# Patient Record
Sex: FEMALE | Race: BLACK OR AFRICAN AMERICAN | NOT HISPANIC OR LATINO | URBAN - METROPOLITAN AREA
[De-identification: names, ages, dates, MRNs, and addresses within clinical notes are randomized per-mention and may not be internally consistent; named-entity substitution may affect disease eponyms.]

---

## 2017-01-01 ENCOUNTER — OFFICE VISIT - HEALTHEAST (OUTPATIENT)
Dept: GERIATRICS | Facility: CLINIC | Age: 52
End: 2017-01-01

## 2017-01-01 ENCOUNTER — AMBULATORY - HEALTHEAST (OUTPATIENT)
Dept: GERIATRICS | Facility: CLINIC | Age: 52
End: 2017-01-01

## 2017-01-01 ENCOUNTER — AMBULATORY - HEALTHEAST (OUTPATIENT)
Dept: ADMINISTRATIVE | Facility: CLINIC | Age: 52
End: 2017-01-01

## 2017-01-01 DIAGNOSIS — I10 ESSENTIAL HYPERTENSION: ICD-10-CM

## 2017-01-01 DIAGNOSIS — C50.919 BREAST CANCER (H): ICD-10-CM

## 2017-01-01 DIAGNOSIS — J18.9 PNEUMONIA: ICD-10-CM

## 2017-01-01 DIAGNOSIS — F32.A DEPRESSION: ICD-10-CM

## 2017-01-01 DIAGNOSIS — I26.99 PULMONARY EMBOLISM (H): ICD-10-CM

## 2017-01-01 DIAGNOSIS — R63.0 POOR APPETITE: ICD-10-CM

## 2017-01-01 DIAGNOSIS — R62.7 FTT (FAILURE TO THRIVE) IN ADULT: ICD-10-CM

## 2017-01-01 DIAGNOSIS — D64.9 ANEMIA: ICD-10-CM

## 2017-01-01 DIAGNOSIS — C79.31 METASTASIS TO BRAIN (H): ICD-10-CM

## 2017-01-01 DIAGNOSIS — D64.9 ANEMIA, UNSPECIFIED TYPE: ICD-10-CM

## 2017-01-01 DIAGNOSIS — C50.919 METASTATIC BREAST CANCER: ICD-10-CM

## 2017-01-01 DIAGNOSIS — I10 HYPERTENSION: ICD-10-CM

## 2017-01-01 DIAGNOSIS — E46 MALNUTRITION (H): ICD-10-CM

## 2017-01-01 RX ORDER — HALOPERIDOL 0.5 MG/1
0.5 TABLET ORAL
Status: SHIPPED | COMMUNITY
Start: 2017-01-01

## 2017-01-01 RX ORDER — CITALOPRAM HYDROBROMIDE 20 MG/1
40 TABLET ORAL DAILY
Status: SHIPPED | COMMUNITY
Start: 2017-01-01

## 2017-01-01 RX ORDER — HALOPERIDOL 2 MG/ML
0.5 SOLUTION ORAL
Status: SHIPPED | COMMUNITY
Start: 2017-01-01

## 2017-01-01 RX ORDER — DEXAMETHASONE 4 MG/1
4 TABLET ORAL EVERY 8 HOURS
Status: SHIPPED | COMMUNITY
Start: 2017-01-01

## 2017-01-01 RX ORDER — DIPHENOXYLATE HCL/ATROPINE 2.5-.025MG
1 TABLET ORAL
Status: SHIPPED | COMMUNITY
Start: 2017-01-01

## 2017-01-01 RX ORDER — LEVETIRACETAM 1000 MG/1
1000 TABLET ORAL EVERY MORNING
Status: SHIPPED | COMMUNITY
Start: 2017-01-01

## 2017-01-01 RX ORDER — ATROPINE SULFATE 10 MG/ML
2 SOLUTION/ DROPS OPHTHALMIC
Status: SHIPPED | COMMUNITY
Start: 2017-01-01

## 2017-01-01 RX ORDER — LORAZEPAM 0.5 MG/1
0.5 TABLET ORAL
Status: SHIPPED | COMMUNITY
Start: 2017-01-01

## 2017-01-01 RX ORDER — LORAZEPAM 2 MG/ML
1 CONCENTRATE ORAL PRN
Status: SHIPPED | COMMUNITY
Start: 2017-01-01

## 2017-01-01 RX ORDER — PRAZOSIN HYDROCHLORIDE 1 MG/1
1 CAPSULE ORAL AT BEDTIME
Status: SHIPPED | COMMUNITY
Start: 2017-01-01

## 2017-01-01 RX ORDER — ACETAMINOPHEN 325 MG/1
650 TABLET ORAL EVERY 4 HOURS PRN
Status: SHIPPED | COMMUNITY
Start: 2017-01-01

## 2017-01-01 RX ORDER — OXYCODONE HYDROCHLORIDE 5 MG/1
5 TABLET ORAL
Status: SHIPPED | COMMUNITY
Start: 2017-01-01

## 2017-01-01 RX ORDER — LEVETIRACETAM 500 MG/1
500 TABLET ORAL AT BEDTIME
Status: SHIPPED | COMMUNITY
Start: 2017-01-01

## 2017-01-01 RX ORDER — ALBUTEROL SULFATE 90 UG/1
2 AEROSOL, METERED RESPIRATORY (INHALATION) EVERY 4 HOURS PRN
Status: SHIPPED | COMMUNITY
Start: 2017-01-01

## 2017-01-01 RX ORDER — PROCHLORPERAZINE MALEATE 10 MG
10 TABLET ORAL EVERY 6 HOURS PRN
Status: SHIPPED | COMMUNITY
Start: 2017-01-01

## 2017-01-01 RX ORDER — BACLOFEN 10 MG/1
5 TABLET ORAL 3 TIMES DAILY PRN
Status: SHIPPED | COMMUNITY
Start: 2017-01-01

## 2017-01-01 ASSESSMENT — MIFFLIN-ST. JEOR: SCORE: 1620.57

## 2017-09-04 ENCOUNTER — AMBULATORY - HEALTHEAST (OUTPATIENT)
Dept: GERIATRICS | Facility: CLINIC | Age: 52
End: 2017-09-04

## 2021-05-31 VITALS — WEIGHT: 220.2 LBS | HEIGHT: 66 IN | BODY MASS INDEX: 35.39 KG/M2

## 2021-05-31 VITALS — WEIGHT: 228 LBS

## 2021-05-31 VITALS — WEIGHT: 211.2 LBS

## 2021-06-10 NOTE — PROGRESS NOTES
[facility]      Facility:    [facility] [[id number]]  Code Status: DNR/DNI       Chief Complaint/Reason for Visit:  Chief Complaint   Patient presents with     H & P       HPI:   Cindi is a 51 y.o. female who admitted  from [facility]. Patient has breast CA that has metastasized to lung and brain. She has been living at home and her 16 y/o dtr has been her primary care giver.       Past Medical History:  Past Medical History:   Diagnosis Date     Altered mental status      Brain metastases      Breast cancer      Depression      Hypertension      Hypertrophy of breast      Low back pain radiating to right lower extremity      Lung metastases      Lymphedema syndrome, postmastectomy      Metastatic breast cancer      Morbid obesity      Normocytic anemia      Oral thrush      SDH (subdural hematoma)      Subdural hygroma      Tachycardia            Surgical History:  Past Surgical History:   Procedure Laterality Date     BRAYAN HOLE FOR SUBDURAL HEMATOMA  05/31/2016    Dr. Mariana Cheek      MASTECTOMY MODIFIED RADICAL  04/27/2006     PORTACATH PLACEMENT  2014    right chest      RECONSTRUCTION BREAST IMMEDIATE / DELAYED W/ TISSUE EXPANDER  04/12/2007     REDUCTION MAMMAPLASTY Right 04/27/2006     STEALTH BRAIN BIOPSY Right 03/11/2016    right frontal brain biopsy: Dr. Mariana Cheek        Family History:   Family History   Problem Relation Age of Onset     Liver disease Mother      Alcohol abuse Mother      Drug abuse Mother      Prostate cancer Father      Hypertension Sister      Hypertension Brother      Anxiety disorder Niece      ADD / ADHD Daughter      Eczema Daughter      Depression Niece      Diabetes type II Niece        Social History:    Social History     Social History     Marital status: Single     Spouse name: N/A     Number of children: N/A     Years of education: N/A     Social History Main Topics     Smoking status: Former Smoker     Packs/day: 1.00      Years: 15.00     Types: Cigarettes     Quit date: 3/1/2004     Smokeless tobacco: Never Used     Alcohol use No     Drug use: Not on file     Sexual activity: Not on file     Other Topics Concern     Not on file     Social History Narrative     No narrative on file       Medications:  Current Outpatient Prescriptions   Medication Sig     acetaminophen (TYLENOL) 325 MG tablet Take 650 mg by mouth every 4 (four) hours as needed for pain.     albuterol (PROAIR HFA;PROVENTIL HFA;VENTOLIN HFA) 90 mcg/actuation inhaler Inhale 2 puffs every 4 (four) hours as needed for wheezing.     baclofen (LIORESAL) 10 MG tablet Take 5 mg by mouth 3 (three) times a day as needed.     citalopram (CELEXA) 20 MG tablet Take 40 mg by mouth daily.     dexamethasone (DECADRON) 4 MG tablet Take 4 mg by mouth 2 (two) times a day with meals.     levETIRAcetam (KEPPRA) 500 MG tablet Take 500 mg by mouth 2 (two) times a day.     omeprazole (PRILOSEC) 20 MG capsule Take 20 mg by mouth daily.     oxyCODONE (ROXICODONE) 5 MG immediate release tablet Take 5 mg by mouth every 6 (six) hours as needed for pain.     potassium chloride SA (K-DUR,KLOR-CON) 20 MEQ tablet Take 20 mEq by mouth daily.     prazosin (MINIPRESS) 1 MG capsule Take 1 mg by mouth at bedtime.     prochlorperazine (COMPAZINE) 10 MG tablet Take 10 mg by mouth every 6 (six) hours as needed for nausea.       Allergies:  Allergies   Allergen Reactions     Blood-Group Specific Substance      Patient is Jehovah Witness and does not want blood transfusions     Penicillins      [place]        Review of Systems:  Pertinent items are noted in HPI.      Physical Exam:   General: Patient is alert, female, lying in bed, no distress.   Vitals: /72, Temp 97, Pulse 70, RR 18, O2 sat 95% RA.  HEENT: Head is NCAT. Eyes show no injection or icterus. Nares negative. Oropharynx well hydrated.  Neck: Supple. No tenderness or adenopathy. No JVD.  Lungs: Clear bilaterally. No  wheezes.  Cardiovascular: Regular rate and rhythm, normal S1. S2.  Back: No spinal or CVA tenderness.  Abdomen: Soft, no tenderness on exam. Bowel sounds present. No guarding rebound or rigidity.  : Deferred.  Extremities: No edema is noted.  Musculoskeletal: Neg.  Skin: No rashes.   Psych: Mood appears somewhat flat though pleasant.        Assessment/Plan:  1. Breast cancer. Metastatic to brain and lung. Will see oncology.  2. Depression. House psychologist to see.  3. Poor appetite. Malnutrition. Dietician consult.  4. HTN.  5. Anemia.  6. Code status is DNR/DNI.      Total time greater than 50 minutes, greater than 50% counseling and coordination of care, time spent in interview and examination of patient, review of records, discussion with nursing staff.      Electronically signed by: Sandrine Mujica MD

## 2021-06-10 NOTE — PROGRESS NOTES
[facility]    Facility:   [facility] [[id number]]   Code Status: DNR/DNI    Chief Complaint   Patient presents with     Follow Up         HPI:   Cindi is a 51 y.o. female who admitted from [facility]. Patient has breast CA that has metastasized to lung and brain. She has been living at home and her 18 y/o dtr has been her primary care giver.       Past Medical History:  Past Medical History:   Diagnosis Date     Altered mental status      Brain metastases      Breast cancer      Depression      Hypertension      Hypertrophy of breast      Low back pain radiating to right lower extremity      Lung metastases      Lymphedema syndrome, postmastectomy      Metastatic breast cancer      Morbid obesity      Normocytic anemia      Oral thrush      SDH (subdural hematoma)      Subdural hygroma      Tachycardia        Medications:  Current Outpatient Prescriptions   Medication Sig     acetaminophen (TYLENOL) 325 MG tablet Take 650 mg by mouth every 4 (four) hours as needed for pain.     albuterol (PROAIR HFA;PROVENTIL HFA;VENTOLIN HFA) 90 mcg/actuation inhaler Inhale 2 puffs every 4 (four) hours as needed for wheezing.     baclofen (LIORESAL) 10 MG tablet Take 5 mg by mouth 3 (three) times a day as needed.     citalopram (CELEXA) 20 MG tablet Take 40 mg by mouth daily.     dexamethasone (DECADRON) 4 MG tablet Take 4 mg by mouth 2 (two) times a day with meals.     levETIRAcetam (KEPPRA) 500 MG tablet Take 500 mg by mouth 2 (two) times a day.     omeprazole (PRILOSEC) 20 MG capsule Take 20 mg by mouth daily.     oxyCODONE (ROXICODONE) 5 MG immediate release tablet Take 5 mg by mouth every 6 (six) hours as needed for pain.     potassium chloride SA (K-DUR,KLOR-CON) 20 MEQ tablet Take 20 mEq by mouth daily.     prazosin (MINIPRESS) 1 MG capsule Take 1 mg by mouth at bedtime.     prochlorperazine (COMPAZINE) 10 MG tablet Take 10 mg by mouth every 6 (six) hours as needed for nausea.        Physical Exam:   General: Patient is alert, female, lying in bed, no distress.   HEENT: Head is NCAT. Eyes show no injection or icterus. Nares negative. Oropharynx well hydrated.  Neck: Supple. No tenderness or adenopathy. No JVD.  Lungs: Clear bilaterally. No wheezes.  Cardiovascular: Regular rate and rhythm, normal S1. S2.  Back: No spinal or CVA tenderness.  Abdomen: Soft, no tenderness on exam. Bowel sounds present. No guarding rebound or rigidity.  : Deferred.  Extremities: No edema is noted.  Musculoskeletal: Neg.  Skin: No rashes.   Psych: Mood appears improved.      Assessment/Plan:  1. Metastatic breast cancer. Saw oncology, new medication started.  2. Depression. Cont Celexa.  3. HTN. On prazosin. Monitor BPs.  4. Poor appetite. Improving.        Electronically signed by: Sandrine Mujica MD

## 2021-06-10 NOTE — PROGRESS NOTES
[facility]    Facility:   [facility] [[id number]]   Code Status: DNR/DNI      Chief Complaint   Patient presents with     Follow Up       HPI:   Cindi is a 51 y.o. female who admitted to TCU  from [facility]. Patient has breast CA that has metastasized to lung and brain. She has been living at home and her 16 y/o dtr has been her primary care giver.  She was hospitalized due to weakness, confusion, poor appetite and intake.She has chronic subdural hematomas. She received treatment with decadron. Had no signs of infection so no antibiotics needed. Celexa was increased due to depression symptoms. Due to overall weakness and FTT,was recommended to come to TCU .    She is feeling better. Appetite has improved. She is participating with therapy. No shortness of breath or chest pain. Bowels normal. No nausea or vomiting.      Past Medical History:  Past Medical History:   Diagnosis Date     Altered mental status      Brain metastases      Breast cancer      Depression      Hypertension      Hypertrophy of breast      Low back pain radiating to right lower extremity      Lung metastases      Lymphedema syndrome, postmastectomy      Metastatic breast cancer      Morbid obesity      Normocytic anemia      Oral thrush      SDH (subdural hematoma)      Subdural hygroma      Tachycardia        Medications:  Current Outpatient Prescriptions   Medication Sig     acetaminophen (TYLENOL) 325 MG tablet Take 650 mg by mouth every 4 (four) hours as needed for pain.     albuterol (PROAIR HFA;PROVENTIL HFA;VENTOLIN HFA) 90 mcg/actuation inhaler Inhale 2 puffs every 4 (four) hours as needed for wheezing.     baclofen (LIORESAL) 10 MG tablet Take 5 mg by mouth 3 (three) times a day as needed.     citalopram (CELEXA) 20 MG tablet Take 40 mg by mouth daily.     dexamethasone (DECADRON) 4 MG tablet Take 4 mg by mouth 2 (two) times a day with meals.     levETIRAcetam (KEPPRA) 500 MG tablet Take  500 mg by mouth 2 (two) times a day.     omeprazole (PRILOSEC) 20 MG capsule Take 20 mg by mouth daily.     oxyCODONE (ROXICODONE) 5 MG immediate release tablet Take 5 mg by mouth every 6 (six) hours as needed for pain.     potassium chloride SA (K-DUR,KLOR-CON) 20 MEQ tablet Take 20 mEq by mouth daily.     prazosin (MINIPRESS) 1 MG capsule Take 1 mg by mouth at bedtime.     prochlorperazine (COMPAZINE) 10 MG tablet Take 10 mg by mouth every 6 (six) hours as needed for nausea.       Physical Exam:   General: Patient is alert, female, lying in bed, no distress.   HEENT: Head is NCAT. Eyes show no injection or icterus. Nares negative. Oropharynx well hydrated.  Neck: Supple. No tenderness or adenopathy. No JVD.  Lungs: Clear bilaterally. No wheezes.  Cardiovascular: Regular rate and rhythm, normal S1. S2.  Back: No spinal or CVA tenderness.  Abdomen: Soft, no tenderness on exam. Bowel sounds present. No guarding rebound or rigidity.  : Deferred.  Extremities: No edema is noted.  Musculoskeletal: Neg.  Skin: No rashes.   Psych: Mood appears good.      Assessment/Plan:  1. Breast cancer, metastatic. Continues on new chemo drug per oncology direction.   2. Depression. On Celexa. Mood improved as she physically feels better.   3. HTN. BPs well controlled on prazosin.   4. Weakness. Participating in therapy with some improvement noted.        Electronically signed by: Sandrine Mujica MD

## 2021-06-11 NOTE — PROGRESS NOTES
[facility]    Name:   Cindi Coronado  : 1965  Facility:   [facility] [[id number]]   Room: 238  Code Status: DNR/DNI -   Fac type:   SNF (Skilled Nursing Facility, TCU) -     CHIEF COMPLAINT / REASON FOR VISIT:  Chief Complaint   Patient presents with     Review Of Multiple Medical Conditions    Patient was last seen by Dr. Gonzalez for an admission visit on 2017.    HPI: Cindi is a 52 y.o. female who has metastatic breast cancer to bladder, lung, and brain.  She states that she was recently told by oncology that there is nothing else that can be done for her cancer.  She verbalized frustration with the suggestion she needs hospice, that there is no hope.  She says that Dr. Gruber (oncology) told her there was nothing more she could do.  At this time, she states she feels like her most recent decline was due to this news. She had been living at home with her 17 year old daughter who was caring for her.  Currently, she is too weak and requires too much care to be discharged to home without a higher level of personal care.  Spoke with SW and awaiting for MA to be initiated in order to set up services for dc to home.        She complains of a poor appetite because it does not appeal to her.  She reports she has been having nausea, vomiting, and diarrhea.  Stool culture was obtained and resulted negative for CDIFF.  She is not asking for ordered compazine 10mg . Instructed her to be sure to ask for the compazine if nauseous as then we can determine if it works for her.  Will monitor weights for need of nutritional support.     She was recently hospitalized for shortness of breath due to pulmonary embolism and pneumonia.  She uses O2 via nasal canula 2-3 liters as needed for symptoms of shortness of breath.  She does not have it on for this visit and does not show signs of respiratory distress.  She is not currently on anticoagulation which is presumably due to risk of  "complicating her subdural hematomas.     Currently, she states her pain is controlled but states when she does have pain it is \"everywhere\" and refers to different kinds of pain. She states she only asks oxycodone about once per day.  Pain appears to be controlled with the 5mg every 6 hours as needed.     She does have chronic subdural hematomas and was treated with decadron. She has depression and we recently increased citalopram to 40mg daily.      She does have diarrhea. We checked for C. diff, and it was negative.  She verbalizes a new symptom of wobbling when up ambulating with walker.  OT and PT were discontinued this week.  No headaches, chest pains, dysuria, constipation, or integumentary issues.    Past Medical History:   Diagnosis Date     Altered mental status      Brain metastases      Breast cancer      Depression      Hypertension      Hypertrophy of breast      Low back pain radiating to right lower extremity      Lung metastases      Lymphedema syndrome, postmastectomy      Metastatic breast cancer      Morbid obesity      Normocytic anemia      Oral thrush      SDH (subdural hematoma)      Subdural hygroma      Tachycardia               Family History   Problem Relation Age of Onset     Liver disease Mother      Alcohol abuse Mother      Drug abuse Mother      Prostate cancer Father      Hypertension Sister      Hypertension Brother      Anxiety disorder Niece      ADD / ADHD Daughter      Eczema Daughter      Depression Niece      Diabetes type II Niece      Social History     Social History     Marital status: Single     Spouse name: N/A     Number of children: N/A     Years of education: N/A     Social History Main Topics     Smoking status: Former Smoker     Packs/day: 1.00     Years: 15.00     Types: Cigarettes     Quit date: 3/1/2004     Smokeless tobacco: Never Used     Alcohol use No     Drug use: Not on file     Sexual activity: Not on file     Other Topics Concern     Not on file " "    Social History Narrative     MEDICATIONS: Reviewed from the MAR, physician orders, and earlier progress notes.  Current Outpatient Prescriptions   Medication Sig     acetaminophen (TYLENOL) 325 MG tablet Take 650 mg by mouth every 4 (four) hours as needed for pain.     albuterol (PROAIR HFA;PROVENTIL HFA;VENTOLIN HFA) 90 mcg/actuation inhaler Inhale 2 puffs every 4 (four) hours as needed for wheezing.     atenolol (TENORMIN) 25 MG tablet Take 25 mg by mouth daily.     baclofen (LIORESAL) 10 MG tablet Take 5 mg by mouth 3 (three) times a day as needed.     citalopram (CELEXA) 20 MG tablet Take 40 mg by mouth daily.     ferrous sulfate 325 (65 FE) MG tablet Take 1 tablet by mouth 2 (two) times a day with meals.     levETIRAcetam (KEPPRA) 500 MG tablet Take 500 mg by mouth 2 (two) times a day.     omeprazole (PRILOSEC) 20 MG capsule Take 20 mg by mouth daily.     oxyCODONE (ROXICODONE) 5 MG immediate release tablet Take 5 mg by mouth every 6 (six) hours as needed for pain.     potassium chloride SA (K-DUR,KLOR-CON) 20 MEQ tablet Take 20 mEq by mouth daily.     prazosin (MINIPRESS) 1 MG capsule Take 1 mg by mouth at bedtime.     prochlorperazine (COMPAZINE) 10 MG tablet Take 10 mg by mouth every 6 (six) hours as needed for nausea.     ALLERGIES:   Allergies   Allergen Reactions     Blood-Group Specific Substance      Patient is Jehovah Witness and does not want blood transfusions     Penicillins      [place]       Vitals:    06/15/17 1540   BP: 111/76   Pulse: 84   Resp: 20   Temp: 97.1  F (36.2  C)   Weight: (!) 228 lb (103.4 kg)     There is no height or weight on file to calculate BMI.    EXAMINATION:   General:  female who is alert and appears in no distress.  She states is \"not good\" when asked how she is doing. She says her legs are wobbly when walking with a walker.  Head: Normocephalic and atraumatic.   Eyes: PERRL, sclerae clear.   ENT: Moist oral mucosa. Good dentition  Cardiovascular: " Regular rate and rhythm, no murmur, clicks or gallops  Respiratory: Lungs clear to auscultation bilaterally.   Abdomen: Soft and nontender.   Musculoskeletal/Extremities: no edema or abnormalities  Integument: No rashes, clinically significant lesions, or skin breakdown.   Cognitive/Psychiatric: Alert and oriented, she scored 28/30 on the MMSE.  Affect flat with some slow tracking of conversation    DIAGNOSTICS:   No results found for this or any previous visit.    No results found for: WBC, HGB, HCT, MCV, PLT  CrCl cannot be calculated (No order found.).    ASSESSMENT/Plan:      ICD-10-CM    1. Metastatic breast cancer C50.919     C79.9     to brain, lung, and bladder   2. Pulmonary embolism I26.99    3. Pneumonia J18.9    4. Hypertension I10    5. Depression F32.9    6. Anemia D64.9      CHANGES:    None.    CARE PLAN:    The care plan has been reviewed and all orders signed. Changes to care plan, if any, as noted. Otherwise, continue care plan of care. Time spent with this patient was approximately 35 minutes, with greater than 50% spent in counseling and coordination of care, much of which involved explaining the process of being discharged, which first would include getting her signed up for medical assistance.      Electronically signed by: Ramonita Menendez I, Ramonita Menendez, am scribing for and in the presence of, LEXIE Walls.

## 2021-06-11 NOTE — PROGRESS NOTES
"[facility]    Name:   Cindi Coronado  : 1965  Facility:   [facility] [[id number]]   Room: 238  Code Status: DNR/DNI -   Fac type:   SNF (Skilled Nursing Facility, TCU) - [facility]  CHIEF COMPLAINT / REASON FOR VISIT:  Chief Complaint   Patient presents with     Review Of Multiple Medical Conditions    Patient was last seen by Dr. Gonzalez for an admission visit on 2017.    HPI: Cindi is a 52 y.o. female who has metastatic breast cancer to bladder, lung, and brain.  She has a medical history of chronic subdural hematomas, depression, and hypertension.  She was recently told by oncology that there is nothing else that can be done for her cancer. She had been living at home with her 17 year old daughter who was caring for her.  Currently, she is too weak and requires too much care to be discharged to home without a higher level of personal care.      She has dramatically declined cognitively in the past couple of weeks.  Today, she is having significant tangential thinking in which her thought process only lasts a few seconds. She reports hearing someone knock on her window constantly and then changes her conversation to \"oh you are still here?\"   She does not appear to understand exam questions.  Today, she is not wearing her wig and is sitting in a chair in a gown and when asked how she is doing states \"bad some of the time and good some of the time.\"    Nursing states, that in the last three days,the patient's appetite continues to be poor and she has started to become nauseous with vomiting during medication administration and meals.  Because of this, she is omitting many of her medications at the 2nd administration. When asked, the patient doesn't recall any symptoms of nausea and vomiting. She does show a 17 pound weight loss in the past 2 weeks.  It is also reported that her weakness has increased requiring more assistance.    Nursing also reports that she " is on continuous O2 at 2-3 liters for shortness of breath related to recent diagnosis of pulmonary embolism. She is not currently on anticoagulation which is presumably due to risk of complicating her subdural hematomas.     Due to drastic decline in the patient's cognitive status we would recommend a conversation with the patient's son to again discuss need for Hospice care.  The nurse will call the son and have him meet with Neto Lazaro NP tomorrow 6/29.  However, nursing also spoke with oncology and they want her to be seen in clinic today 6/28.  Will follow up tomorrow on plan of care.    Review of systems is difficult due to patient's cognitive impairment.  However, she does deny headaches, pain, shortness of breath, chest pain, and issues with GI/.        Past Medical History:   Diagnosis Date     Altered mental status      Brain metastases      Breast cancer      Depression      Hypertension      Hypertrophy of breast      Low back pain radiating to right lower extremity      Lung metastases      Lymphedema syndrome, postmastectomy      Metastatic breast cancer      Morbid obesity      Normocytic anemia      Oral thrush      SDH (subdural hematoma)      Subdural hygroma      Tachycardia               Family History   Problem Relation Age of Onset     Liver disease Mother      Alcohol abuse Mother      Drug abuse Mother      Prostate cancer Father      Hypertension Sister      Hypertension Brother      Anxiety disorder Niece      ADD / ADHD Daughter      Eczema Daughter      Depression Niece      Diabetes type II Niece      Social History     Social History     Marital status: Single     Spouse name: N/A     Number of children: N/A     Years of education: N/A     Social History Main Topics     Smoking status: Former Smoker     Packs/day: 1.00     Years: 15.00     Types: Cigarettes     Quit date: 3/1/2004     Smokeless tobacco: Never Used     Alcohol use No     Drug use: Not on file     Sexual activity:  Not on file     Other Topics Concern     Not on file     Social History Narrative     MEDICATIONS: Reviewed from the MAR, physician orders, and earlier progress notes.  Current Outpatient Prescriptions   Medication Sig     acetaminophen (TYLENOL) 325 MG tablet Take 650 mg by mouth every 4 (four) hours as needed for pain.     albuterol (PROAIR HFA;PROVENTIL HFA;VENTOLIN HFA) 90 mcg/actuation inhaler Inhale 2 puffs every 4 (four) hours as needed for wheezing.     atenolol (TENORMIN) 25 MG tablet Take 25 mg by mouth daily.     baclofen (LIORESAL) 10 MG tablet Take 5 mg by mouth 3 (three) times a day as needed.     citalopram (CELEXA) 20 MG tablet Take 40 mg by mouth daily.     ferrous sulfate 325 (65 FE) MG tablet Take 1 tablet by mouth 2 (two) times a day with meals.     levETIRAcetam (KEPPRA) 500 MG tablet Take 500 mg by mouth 2 (two) times a day.     omeprazole (PRILOSEC) 20 MG capsule Take 20 mg by mouth daily.     oxyCODONE (ROXICODONE) 5 MG immediate release tablet Take 5 mg by mouth every 6 (six) hours as needed for pain.     potassium chloride SA (K-DUR,KLOR-CON) 20 MEQ tablet Take 20 mEq by mouth daily.     prazosin (MINIPRESS) 1 MG capsule Take 1 mg by mouth at bedtime.     prochlorperazine (COMPAZINE) 10 MG tablet Take 10 mg by mouth every 6 (six) hours as needed for nausea.     ALLERGIES:   Allergies   Allergen Reactions     Blood-Group Specific Substance      Patient is Jehovah Witness and does not want blood transfusions     Penicillins      [place]       Vitals:    06/28/17 1138   BP: 111/76   Pulse: 84   Resp: 20   Temp: 97.1  F (36.2  C)   Weight: 211 lb 3.2 oz (95.8 kg)     There is no height or weight on file to calculate BMI.    EXAMINATION:   General:  female who is alert and appears in no distress. Bald and sitting in her chair.   Head: Normocephalic and atraumatic.   Eyes: PERRL, sclerae clear.   ENT: Moist oral mucosa. Good dentition  Cardiovascular: Regular rate and rhythm,  2/6 YIMI  Respiratory: Lungs clear to auscultation bilaterally.   Abdomen: Soft and nontender.   Musculoskeletal/Extremities: no edema or abnormalities  Integument: No rashes, clinically significant lesions, or skin breakdown.   Cognitive/Psychiatric: Alert but not oriented, Affect euthymic with tangential thought process    DIAGNOSTICS:   None    ASSESSMENT/Plan:      ICD-10-CM    1. Metastatic breast cancer C50.919     C79.9    2. Pulmonary embolism I26.99    3. Essential hypertension I10    4. Depression F32.9    5. Anemia D64.9      CHANGES:    Plan to discuss clinical status and hospice tomorrow.      CARE PLAN:    The care plan has been reviewed and all orders signed. Changes to care plan, if any, as noted. Otherwise, continue care plan of care.  Electronically signed by: Ramonita Menendez I, Ramonita Menendez am scribing for and in the presence of, LEXIE Walls.

## 2021-06-11 NOTE — PROGRESS NOTES
[facility]      Facility:    [facility] [[id number]]  Code Status: DNR/DNI       Chief Complaint/Reason for Visit:  Chief Complaint   Patient presents with     H & P       HPI:   Cindi is a 52 y.o. female who is re-admitted to TCU  from [facility]. Patient has breast CA that has metastasized to lung and brain. She has been living at home and her 16 y/o dtr has been her primary care giver.  She was hospitalized due to weakness, confusion, poor appetite and intake.She has chronic subdural hematomas. She received treatment with decadron. Had no signs of infection so no antibiotics needed. Celexa was increased due to depression symptoms. Due to overall weakness and FTT, was recommended to come to TCU. She is felt better. Appetite improved.      On 5/29 c/o cough and pain in chest, radiating down. VS were taken when resident returned from ambulating from the bathroom, pulse was elevated(134). Resident c/o difficulty breathing, sats were at 97%. Crackles were noted bilaterally. Resident encouraged to cough and deep breath frequently throughout the day, refused to elevate head stated that it felt unnatural to do so. O2 was placed on resident via concentrator for comfort. Resident was given PRN Tylenol and allowed to rest. Resident has not eaten solid foods, has accepted nutritional supplements and was encouraged to drink fluids. Pulse was taken periodically throughout the day, average was 110 bpm. Resident states that she is comfortable at this time and can breathe better.  On 5/30/17 sent to the hospital for weak, SOB, on O2 with SAT of 98 on and off oxygen. C/o right side pain and lungs hurting to breathe.  Difficulty tracking conversation.     Diagnosed with pneumonia. Awaiting hospital discharge paperwork. She is feeling better. No need for oxygen. No chest pain or shortness of breath at rest. BPs have been elevated, she was previously on Atenolol, no order from hospital  so it was reinstated today. No abdominal pain.      Past Medical History:  Past Medical History:   Diagnosis Date     Altered mental status      Brain metastases      Breast cancer      Depression      Hypertension      Hypertrophy of breast      Low back pain radiating to right lower extremity      Lung metastases      Lymphedema syndrome, postmastectomy      Metastatic breast cancer      Morbid obesity      Normocytic anemia      Oral thrush      SDH (subdural hematoma)      Subdural hygroma      Tachycardia            Surgical History:  Past Surgical History:   Procedure Laterality Date     BRAYAN HOLE FOR SUBDURAL HEMATOMA  05/31/2016    Dr. Mariana Cheek      MASTECTOMY MODIFIED RADICAL  04/27/2006     PORTACATH PLACEMENT  2014    right chest      RECONSTRUCTION BREAST IMMEDIATE / DELAYED W/ TISSUE EXPANDER  04/12/2007     REDUCTION MAMMAPLASTY Right 04/27/2006     STEALTH BRAIN BIOPSY Right 03/11/2016    right frontal brain biopsy: Dr. Mariana Cheek        Family History:   Family History   Problem Relation Age of Onset     Liver disease Mother      Alcohol abuse Mother      Drug abuse Mother      Prostate cancer Father      Hypertension Sister      Hypertension Brother      Anxiety disorder Niece      ADD / ADHD Daughter      Eczema Daughter      Depression Niece      Diabetes type II Niece        Social History:    Social History     Social History     Marital status: Single     Spouse name: N/A     Number of children: N/A     Years of education: N/A     Social History Main Topics     Smoking status: Former Smoker     Packs/day: 1.00     Years: 15.00     Types: Cigarettes     Quit date: 3/1/2004     Smokeless tobacco: Never Used     Alcohol use No     Drug use: Not on file     Sexual activity: Not on file     Other Topics Concern     Not on file     Social History Narrative     No narrative on file       Medications:  Current Outpatient Prescriptions   Medication Sig     acetaminophen (TYLENOL) 325 MG tablet  Take 650 mg by mouth every 4 (four) hours as needed for pain.     albuterol (PROAIR HFA;PROVENTIL HFA;VENTOLIN HFA) 90 mcg/actuation inhaler Inhale 2 puffs every 4 (four) hours as needed for wheezing.     atenolol (TENORMIN) 25 MG tablet Take 25 mg by mouth daily.     baclofen (LIORESAL) 10 MG tablet Take 5 mg by mouth 3 (three) times a day as needed.     cefpodoxime (VANTIN) 100 MG tablet Take 100 mg by mouth 2 (two) times a day.     citalopram (CELEXA) 20 MG tablet Take 40 mg by mouth daily.     ferrous sulfate 325 (65 FE) MG tablet Take 1 tablet by mouth 2 (two) times a day with meals.     levETIRAcetam (KEPPRA) 500 MG tablet Take 500 mg by mouth 2 (two) times a day.     omeprazole (PRILOSEC) 20 MG capsule Take 20 mg by mouth daily.     oxyCODONE (ROXICODONE) 5 MG immediate release tablet Take 5 mg by mouth every 6 (six) hours as needed for pain.     potassium chloride SA (K-DUR,KLOR-CON) 20 MEQ tablet Take 20 mEq by mouth daily.     prazosin (MINIPRESS) 1 MG capsule Take 1 mg by mouth at bedtime.     prochlorperazine (COMPAZINE) 10 MG tablet Take 10 mg by mouth every 6 (six) hours as needed for nausea.        Allergies:  Allergies   Allergen Reactions     Blood-Group Specific Substance      Patient is Jehovah Witness and does not want blood transfusions     Penicillins      [place]        Review of Systems:  Pertinent items are noted in HPI.      Physical Exam:   General: Patient is alert, pleasant female, no distress.   Vitals: /98, Temp 98.3, Pulse 72, RR 20, O2 sat 96% RA.  HEENT: Head is NCAT. Eyes show no injection or icterus. Nares negative. Oropharynx well hydrated.  Neck: Supple. No tenderness or adenopathy. No JVD.  Lungs: Clear bilaterally. No wheezes.  Cardiovascular: Regular rate and rhythm, normal S1. S2.  Back: No spinal or CVA tenderness.  Abdomen: Soft, no tenderness on exam. Bowel sounds present. No guarding rebound or rigidity.  : Deferred.  Extremities: No edema is  noted.  Musculoskeletal: Neg.  Skin: No rashes.  Psych: Mood appears good.      Labs:  None available.      Assessment/Plan:  1. Pneumonia. Finish antibiotics.  2. Breast cancer. Metastatic to brain and lung. On Keppra for seizure prophylaxis. Follow up with oncology.  3. HTN. Restart Atenolol. Monitor BPs.  4. Depression. Continue Celexa.  5. Anemia. No details.   6. Code status is DNR/DNI.      Total time greater than 45 minutes, greater than 50% counseling and coordination of care, time spent in interview and examination of patient, review of records, discussion with nursing staff.      Electronically signed by: Sandrine Mujica MD

## 2021-06-11 NOTE — PROGRESS NOTES
[facility]      Facility:    [facility] [[id number]]  Code Status: DNR/DNI       Chief Complaint/Reason for Visit:  Chief Complaint   Patient presents with     H & P     Re-admit, now LTC on Hospice. (H & P 7/19/17).       HPI:   Cindi is a 52 y.o. female with hx of breast CA that has metastasized to lung and brain. She had been hospitalized in May due to due to weakness, confusion, poor appetite and intake. She has chronic subdural hematomas. She received treatment with decadron. Had no signs of infection so no antibiotics needed. Celexa was increased due to depression symptoms. Due to overall weakness and FTT, was recommended to come to TCU. She did not do very well in the TCU and was re hospitalized in June for pneumonia. She had been following with heme/onc throughout this period and started on Faslodex for the breast cancer. She continued to do poorly and was sent back to the hospital from TCU on 6/28/17 for increased confusion, weakness and vomiting.    She was at [facility] 6/28/17-7/1/17. She was seen by oncology who advised Hospice care, confirming his admission she is not a candidate for additional treatments unless she had significant improvement in functional status which is unlikely to occur. Initially she was unable to verbalize and had active hallucinations but improved condition with decadron to be able to communicate. Imaging of head and chest/abd/pelvis showed disease progression. Palliative care was consulted and patient and family elected to pursue Hospice. She was discharged back to [facility] on 7/1/17 as LTC status on [facility].    She continues to be weak, very tired with poor appetite. She does not get out of bed much. No vomiting. States she is not having any pain currently. Denies headache or dizziness. ROS may be unreliable, she has exhibited some confusion per nurses observations. She is on oxygen. She denies shortness of breath or chest pain.        Past Medical History:  Past Medical History:   Diagnosis Date     Acute respiratory failure with hypoxia      Altered mental status      Brain metastases      Breast cancer      Depression      Hypertension      Hypertrophy of breast      Hypomagnesemia      Low back pain radiating to right lower extremity      Lung metastases      Lymphedema syndrome, postmastectomy      Metastatic breast cancer      Morbid obesity      Normocytic anemia      Oral thrush      Pyuria      SDH (subdural hematoma)      Subdural hygroma      Tachycardia            Surgical History:  Past Surgical History:   Procedure Laterality Date     BRAYAN HOLE FOR SUBDURAL HEMATOMA  05/31/2016    Dr. Mariana Cheek      MASTECTOMY MODIFIED RADICAL  04/27/2006     PORTACATH PLACEMENT  2014    right chest      RECONSTRUCTION BREAST IMMEDIATE / DELAYED W/ TISSUE EXPANDER  04/12/2007     REDUCTION MAMMAPLASTY Right 04/27/2006     STEALTH BRAIN BIOPSY Right 03/11/2016    right frontal brain biopsy: Dr. Mariana Cheek        Family History:   Family History   Problem Relation Age of Onset     Liver disease Mother      Alcohol abuse Mother      Drug abuse Mother      Prostate cancer Father      Hypertension Sister      Hypertension Brother      Anxiety disorder Niece      ADD / ADHD Daughter      Eczema Daughter      Depression Niece      Diabetes type II Niece        Social History:    Social History     Social History     Marital status: Single     Spouse name: N/A     Number of children: N/A     Years of education: N/A     Social History Main Topics     Smoking status: Former Smoker     Packs/day: 1.00     Years: 15.00     Types: Cigarettes     Quit date: 3/1/2004     Smokeless tobacco: Never Used     Alcohol use No     Drug use: No     Sexual activity: Not on file     Other Topics Concern     Not on file     Social History Narrative        Medications:  Current Outpatient Prescriptions   Medication Sig     acetaminophen (TYLENOL) 325 MG tablet  Take 650 mg by mouth every 4 (four) hours as needed for pain.     albuterol (PROAIR HFA;PROVENTIL HFA;VENTOLIN HFA) 90 mcg/actuation inhaler Inhale 2 puffs every 4 (four) hours as needed for wheezing.     atropine 1 % ophthalmic solution Take 2 drops by mouth every hour as needed.     baclofen (LIORESAL) 10 MG tablet Take 5 mg by mouth 3 (three) times a day as needed.     citalopram (CELEXA) 20 MG tablet Take 40 mg by mouth daily.     dexamethasone (DECADRON) 4 MG tablet Take 4 mg by mouth every 8 (eight) hours.     haloperidol (HALDOL) 0.5 MG tablet Take 0.5 mg by mouth every hour as needed.     levETIRAcetam (KEPPRA) 500 MG tablet Take 500 mg by mouth 2 (two) times a day.     LORazepam (ATIVAN) 0.5 MG tablet Take 1 mg by mouth every 2 (two) hours as needed for anxiety.     LORazepam (ATIVAN) 2 mg/mL concentrated solution Take 1 mg by mouth as needed. every 15 min up to 4 times during a seizure (Notify hospice if not effective)     omeprazole (PRILOSEC) 20 MG capsule Take 20 mg by mouth daily.     oxyCODONE (ROXICODONE) 5 MG immediate release tablet Take 5 mg by mouth every hour as needed for pain.      prazosin (MINIPRESS) 1 MG capsule Take 1 mg by mouth at bedtime.     prochlorperazine (COMPAZINE) 10 MG tablet Take 10 mg by mouth every 6 (six) hours as needed for nausea.       Allergies:  Allergies   Allergen Reactions     Blood-Group Specific Substance      Patient is Jehovah Witness and does not want blood transfusions     Penicillins      [place]        Review of Systems:  Pertinent items are noted in HPI.      Physical Exam:   General: Patient is alert, fatigued appearing female, lying in bed, no distress.   Vitals: /72, Temp 98.4, Pulse 98, RR 20, O2 sat 95% on O2.  HEENT: Head is NCAT. Eyes show no injection or icterus. Nares negative. Oropharynx well hydrated.  Neck: Supple. No tenderness or adenopathy. No JVD.  Lungs: Clear bilaterally. No wheezes.  Cardiovascular: Regular rate and rhythm, normal  S1. S2.  Back: No spinal or CVA tenderness.  Abdomen: Soft, no tenderness on exam. Bowel sounds present. No guarding rebound or rigidity.  : Deferred.  Extremities: No edema is noted.  Musculoskeletal: Neg.  Skin: No rashes.   Psych: Difficult to assess. Too lethargic.      Labs:  None.      Assessment/Plan:  1. Breast cancer. Mets to lungs and brain. Significant decline with disease progressing. No further treatments per oncology. Now Hospice, focus on comfort. She is on dexamethasone for swelling and Keppra for seizure prophylaxis.  2. HTN. On prazosin. Previously also on Atenolol, not needed at this point. Will follow.  3. Anemia. Noted per records.  4. Depression. Continues on Citalopram.  5. FTT. Overall decline and weakness.   6. Code status is DNR/DNI/Hospice.      Total time greater than 35 minutes, greater than 50% counseling and coordination of care, time spent in interview and examination of patient, review of records, discussion with nursing staff.      Electronically signed by: Sandrine Mujica MD

## 2021-06-11 NOTE — PROGRESS NOTES
[facility]    Facility:   [facility] [[id number]]   Code Status: DNR/DNI      Chief Complaint   Patient presents with     Follow Up       HPI:  Cindi is a 52 y.o. female who is re-admitted to TCU  from [facility]. Patient has breast CA that has metastasized to lung and brain. She has been living at home and her 18 y/o dtr has been her primary care giver.  She was hospitalized due to weakness, confusion, poor appetite and intake.She has chronic subdural hematomas. She received treatment with decadron. Had no signs of infection so no antibiotics needed. Celexa was increased due to depression symptoms. Due to overall weakness and FTT, was recommended to come to TCU. She is felt better. Appetite improved.      On 5/29 c/o cough and pain in chest, radiating down. VS were taken when resident returned from ambulating from the bathroom, pulse was elevated(134). Resident c/o difficulty breathing, sats were at 97%. Crackles were noted bilaterally. Resident encouraged to cough and deep breath frequently throughout the day, refused to elevate head stated that it felt unnatural to do so. O2 was placed on resident via concentrator for comfort. Resident was given PRN Tylenol and allowed to rest. Resident has not eaten solid foods, has accepted nutritional supplements and was encouraged to drink fluids. Pulse was taken periodically throughout the day, average was 110 bpm. Resident states that she is comfortable at this time and can breathe better.  On 5/30/17 sent to the hospital for weak, SOB, on O2 with SAT of 98 on and off oxygen. C/o right side pain and lungs hurting to breathe.  Difficulty tracking conversation.     Diagnosed with pneumonia. Awaiting hospital discharge paperwork. She is feeling better. No need for oxygen. No chest pain or shortness of breath at rest. BPs have been elevated, she was previously on Atenolol, no order from hospital so it was reinstated today. No  abdominal pain.      Past Medical History:  Past Medical History:   Diagnosis Date     Altered mental status      Brain metastases      Breast cancer      Depression      Hypertension      Hypertrophy of breast      Low back pain radiating to right lower extremity      Lung metastases      Lymphedema syndrome, postmastectomy      Metastatic breast cancer      Morbid obesity      Normocytic anemia      Oral thrush      SDH (subdural hematoma)      Subdural hygroma      Tachycardia        Medications:  Current Outpatient Prescriptions   Medication Sig     acetaminophen (TYLENOL) 325 MG tablet Take 650 mg by mouth every 4 (four) hours as needed for pain.     albuterol (PROAIR HFA;PROVENTIL HFA;VENTOLIN HFA) 90 mcg/actuation inhaler Inhale 2 puffs every 4 (four) hours as needed for wheezing.     atenolol (TENORMIN) 25 MG tablet Take 25 mg by mouth daily.     baclofen (LIORESAL) 10 MG tablet Take 5 mg by mouth 3 (three) times a day as needed.     citalopram (CELEXA) 20 MG tablet Take 40 mg by mouth daily.     ferrous sulfate 325 (65 FE) MG tablet Take 1 tablet by mouth 2 (two) times a day with meals.     levETIRAcetam (KEPPRA) 500 MG tablet Take 500 mg by mouth 2 (two) times a day.     omeprazole (PRILOSEC) 20 MG capsule Take 20 mg by mouth daily.     oxyCODONE (ROXICODONE) 5 MG immediate release tablet Take 5 mg by mouth every 6 (six) hours as needed for pain.     potassium chloride SA (K-DUR,KLOR-CON) 20 MEQ tablet Take 20 mEq by mouth daily.     prazosin (MINIPRESS) 1 MG capsule Take 1 mg by mouth at bedtime.     prochlorperazine (COMPAZINE) 10 MG tablet Take 10 mg by mouth every 6 (six) hours as needed for nausea.        Physical Exam:   General: Patient is alert, female, lying in bed, no distress.   HEENT: Head is NCAT. Eyes show no injection or icterus. Nares negative. Oropharynx well hydrated.  Neck: Supple. No tenderness or adenopathy. No JVD.  Lungs: Clear bilaterally. No wheezes.  Cardiovascular: Regular rate  and rhythm, normal S1. S2.  Back: No spinal or CVA tenderness.  Abdomen: Soft, no tenderness on exam. Bowel sounds present. No guarding rebound or rigidity.  : Deferred.  Extremities: No edema is noted.  Musculoskeletal: Neg.  Skin: No rashes.   Psych: Mood appears good.      Assessment/Plan:  1. Breast cancer, metastatic. Follows with oncology.   2. HTN. Cont Atenolol.   3. Depression. On Celexa.   4. Anemia. On iron.       Electronically signed by: Sandrine Mujica MD

## 2021-06-11 NOTE — PROGRESS NOTES
[facility]    Facility:   [facility] [[id number]]   Code Status: DNR/DNI       Chief Complaint   Patient presents with     Follow Up       HPI:  Cindi is a 52 y.o. female who is re-admitted to TCU  from [facility]. Patient has breast CA that has metastasized to lung and brain. She has been living at home and her 18 y/o dtr has been her primary care giver.  She was hospitalized due to weakness, confusion, poor appetite and intake.She has chronic subdural hematomas. She received treatment with decadron. Had no signs of infection so no antibiotics needed. Celexa was increased due to depression symptoms. Due to overall weakness and FTT, was recommended to come to TCU. She is felt better. Appetite improved.      On 5/29 c/o cough and pain in chest, radiating down. VS were taken when resident returned from ambulating from the bathroom, pulse was elevated(134). Resident c/o difficulty breathing, sats were at 97%. Crackles were noted bilaterally. Resident encouraged to cough and deep breath frequently throughout the day, refused to elevate head stated that it felt unnatural to do so. O2 was placed on resident via concentrator for comfort. Resident was given PRN Tylenol and allowed to rest. Resident has not eaten solid foods, has accepted nutritional supplements and was encouraged to drink fluids. Pulse was taken periodically throughout the day, average was 110 bpm. Resident states that she is comfortable at this time and can breathe better.  On 5/30/17 sent to the hospital for weak, SOB, on O2 with SAT of 98 on and off oxygen. C/o right side pain and lungs hurting to breathe.  Difficulty tracking conversation.     Diagnosed with pneumonia. Awaiting hospital discharge paperwork. She is feeling better. No need for oxygen. No chest pain or shortness of breath at rest. BPs have been elevated, she was previously on Atenolol, no order from hospital so it was reinstated today. No  abdominal pain.      Past Medical History:  Past Medical History:   Diagnosis Date     Altered mental status      Brain metastases      Breast cancer      Depression      Hypertension      Hypertrophy of breast      Low back pain radiating to right lower extremity      Lung metastases      Lymphedema syndrome, postmastectomy      Metastatic breast cancer      Morbid obesity      Normocytic anemia      Oral thrush      SDH (subdural hematoma)      Subdural hygroma      Tachycardia        Subjective:  She seems a little confused at times, a  little slow to respond. States no cough, nursing states sometimes she says she is SOB and wants to use her inhaler. Has Oxygen prn, usually sats are good but she feels better with the oxygen on so it is used for comfort. No chest pain. She is eating better. She will have follow up with heme/onc and palliative care in the clinic. No fever. Denies cough or chest congestion.       Physical Exam:   General: Patient is alert, female, lying in bed, no distress.   HEENT: Head is NCAT. Eyes show no injection or icterus. Nares negative. Oropharynx well hydrated.  Neck: Supple. No tenderness or adenopathy. No JVD.  Lungs: Clear bilaterally. No wheezes.  Cardiovascular: Regular rate and rhythm, normal S1. S2.  Back: No spinal or CVA tenderness.  Abdomen: Soft, no tenderness on exam. Bowel sounds present. No guarding rebound or rigidity.  : Deferred.  Extremities: No edema is noted.  Musculoskeletal: Neg.  Skin: No rashes.   Psych: Mood appears good.      Assessment/Plan:  1. Breast cancer, metastatic. Folows with oncology.   2. HTN. Better since Atenolol restarted.   3. Depression. On Celexa.   4. Pneumonia. She completed antibiotics.  5. Anemia. On iron.         Electronically signed by: Sandrine Mujica MD

## 2021-06-11 NOTE — PROGRESS NOTES
[facility]      Code Status:  POLST AVAILABLE  Visit Type: H & P     Facility:  [facility] [[id number]]           History of Present Illness: Cindi Coronado is a 52 y.o. female who is currently readmitted to the TCU as a transfer from [facility]  This is a 51-year-old who was admitted in [facility] from the TCU on 5/30/17 with shortness of breath and concern for pneumonia.  CT angiogram was obtained while she was admitted that was concerning for lymphangitic spread of her cancer.  She was however empirically treated with antibiotic treatments and significantly improved.  She was medically stabilized she was having some pain issues with hip pain.  Overall patient was felt to be stable to discharge back to the nursing home.  Unfortunately patient has had progressive metastatic disease with metastases to brain lungs adrenals.  Overall comfort care hospice cares have been recommended due to the poor prognosis patient is resistant to that idea and plans to see her medical oncologist tomorrow to discuss treatment options  She is unfortunately not doing much she has fecal incontinence which remains a bothersome issue for her.    Past Medical History:   Diagnosis Date     Altered mental status      Brain metastases      Breast cancer      Depression      Hypertension      Hypertrophy of breast      Low back pain radiating to right lower extremity      Lung metastases      Lymphedema syndrome, postmastectomy      Metastatic breast cancer      Morbid obesity      Normocytic anemia      Oral thrush      SDH (subdural hematoma)      Subdural hygroma      Tachycardia      Past Surgical History:   Procedure Laterality Date     BRAYAN HOLE FOR SUBDURAL HEMATOMA  05/31/2016    Dr. Mariana Cheek      MASTECTOMY MODIFIED RADICAL  04/27/2006     PORTACATH PLACEMENT  2014    right chest      RECONSTRUCTION BREAST IMMEDIATE / DELAYED W/ TISSUE EXPANDER  04/12/2007     REDUCTION  MAMMAPLASTY Right 04/27/2006     [facility]/11[facility]    right frontal brain biopsy: Dr. Mariana Cheek      Family History   Problem Relation Age of Onset     Liver disease Mother      Alcohol abuse Mother      Drug abuse Mother      Prostate cancer Father      Hypertension Sister      Hypertension Brother      Anxiety disorder Niece      ADD / ADHD Daughter      Eczema Daughter      Depression Niece      Diabetes type II Niece      Social History     Social History     Marital status: Single     Spouse name: N/A     Number of children: N/A     Years of education: N/A     Occupational History     Not on file.     Social History Main Topics     Smoking status: Former Smoker     Packs/day: 1.00     Years: 15.00     Types: Cigarettes     Quit date: 3/1/2004     Smokeless tobacco: Never Used     Alcohol use No     Drug use: Not on file     Sexual activity: Not on file     Other Topics Concern     Not on file     Social History Narrative     Current Outpatient Prescriptions   Medication Sig Dispense Refill     acetaminophen (TYLENOL) 325 MG tablet Take 650 mg by mouth every 4 (four) hours as needed for pain.       albuterol (PROAIR HFA;PROVENTIL HFA;VENTOLIN HFA) 90 mcg/actuation inhaler Inhale 2 puffs every 4 (four) hours as needed for wheezing.       atenolol (TENORMIN) 25 MG tablet Take 25 mg by mouth daily.       baclofen (LIORESAL) 10 MG tablet Take 5 mg by mouth 3 (three) times a day as needed.       citalopram (CELEXA) 20 MG tablet Take 40 mg by mouth daily.       ferrous sulfate 325 (65 FE) MG tablet Take 1 tablet by mouth 2 (two) times a day with meals.       levETIRAcetam (KEPPRA) 500 MG tablet Take 500 mg by mouth 2 (two) times a day.       omeprazole (PRILOSEC) 20 MG capsule Take 20 mg by mouth daily.       oxyCODONE (ROXICODONE) 5 MG immediate release tablet Take 5 mg by mouth every 6 (six) hours as needed for pain.       potassium chloride SA (K-DUR,KLOR-CON) 20 MEQ tablet Take 20 mEq by  mouth daily.       prazosin (MINIPRESS) 1 MG capsule Take 1 mg by mouth at bedtime.       prochlorperazine (COMPAZINE) 10 MG tablet Take 10 mg by mouth every 6 (six) hours as needed for nausea.       No current facility-administered medications for this visit.      Allergies   Allergen Reactions     Blood-Group Specific Substance      Patient is Jehovah Witness and does not want blood transfusions     Penicillins      [place]ico      Review of Systems:    Constitutional: Negative.  Negative for fever, chills, activity change, appetite change and fatigue.   HENT: Negative for congestion and facial swelling.    Eyes: Negative for photophobia, redness and visual disturbance.   Respiratory: Negative for cough and chest tightness.    Cardiovascular: Negative for chest pain, palpitations and leg swelling.   Gastrointestinal: Negative for nausea, diarrhea, constipation, blood in stool and abdominal distention.   Genitourinary: Negative.    Musculoskeletal: Negative.    Skin: Negative.    Neurological: Negative for dizziness, tremors, syncope, weakness, light-headedness and headaches.   Hematological: Does not bruise/bleed easily.   Psychiatric/Behavioral: Negative.      Vitals:    06/08/17 1202   BP: 126/50   Pulse: 72   Resp: 18   Temp: 98  F (36.7  C)       Physical Exam:    GENERAL: no acute distress. Cooperative in conversation.   HEENT: pupils are equal, round and reactive. Oral mucosa is moist and intact.  RESP:Chest symmetric. Regular respiratory rate. No stridor.  Right breast normal on exam left breast was missing nipple there is a healed surgical scar noted and a hardened lump felt under the breast consistent with her history of breast cancer  CVS: S1S2  ABD: Nondistended, soft.  EXTREMITIES: No lower extremity edema.   NEURO: non focal. Alert and oriented x3.   PSYCH: within normal limits. No depression or anxiety.  SKIN: warm dry intact     Labs:    CT angiogram of her chest on 530/17 showed no pulmonary  embolism worsening opacification of the right hemithorax with enlarging right hilar and mediastinal adenopathy and interstitial nodular infiltrates in the right lung with likely representing lymphangitic metastasis she had tiny right pleural effusion with narrowed SVC containing a central catheter tip in the upper right atrium  Enlarging bilateral adrenal masses enlarging subcutaneous nodules in the anterior subcutaneous fat  Degenerative spine with left convex thoracolumbar curve  Hemoglobin in the hospital was 7.5 white count 3.9 platelets 216  Bun 8 creatinine 0.6; sodium 133 potassium 3.8    Assessment/Plan:    Acute respiratory failure with hypoxia.  She was weaned off oxygen and started on prednisone felt to be related to healthcare associated pneumonia and worsening metastases in the lung.  Healthcare associated pneumonia she was given broad-spectrum antibiotics. she had a good response continue to monitor. Afebrile.  Metastatic breast cancer with metastases to the brain and lung .oncology as well as palliative care saw her but she decided to be full code and does not want to pursue palliation treatment .she wants to continue with chemo plans to see her medical oncologist tomorrow again to discuss treatment options.  Low back pain continue with Tylenol and oxycodone  Depression and home dosage of Celexa and prazosin  Seizure prophylaxis-on keppra  Chronic subdural hematomas  Anemia with workup revealing low Hg. she was started on  Fe supplementation. felt to be multifactorial patient is a Yazidism and has refused blood transfusion in the past  Electrolyte abnormalities with low magnesium which were corrected in the hospital  Debilitation quite profound she is preferring to be bedbound  Fecal incontinence this is an ongoing issue which is quite debilitating to the patient unfortunately different modalities of failed  Total time spent was 45 minutes, more than half of it was in face-to-face counseling  regarding disease state, treatment, side effects, documentation, review of clinical data and coordination of care  Electronically signed by: LYDIA Pedersen  This progress note was completed using Dragon software and there may be grammatical errors.

## 2021-06-12 NOTE — PROGRESS NOTES
[facility]    Name:   Cindi Coronado  : 1965  Facility:   [facility] [[id number]]   Room: 238  Code Status: DNR/DNI - [facility]  Fac type:   NF (Long Term Care, LTC)  CHIEF COMPLAINT / REASON FOR VISIT:    Chief Complaint   Patient presents with     Review Of Multiple Medical Conditions    Patient was last seen by Dr. Mujica on 17.    HPI: Cindi is a 52 y.o. female who has metastatic breast cancer to bladder, lung, and brain.  She has a medical history of chronic subdural hematomas, depression, and hypertension.  She was recently told by oncology that there is nothing else that can be done for her cancer. She had been living at home with her 17 year old daughter who was caring for her.  Currently, she is too weak and requires too much care to be discharged to home without a higher level of personal care.  She is obese with a BMI of >35.    She has dramatically declined cognitively in the last month or so, although her cognition does tend to wax and wane.  Her appetite tends to vary as well.  She is now on hospice care.    She is on continuous O2 at 2-3 liters for shortness of breath related to recent diagnosis of pulmonary embolism. She is not currently on anticoagulation which is presumably due to risk of complicating her subdural hematomas.     Due to drastic decline at my last visit, we did recommend a conversation with the patient's son regarding hospice care.  Oncology wanted to see her, and she was sent to [facility], stating they are from 17 until 17.  Oncology also advised hospice care, and she did sign on shortly thereafter.    She did go out on an PADMINI last weekend (a [employer] retreat) and still appears a bit exhausted from that.  No nausea or vomiting, dyspnea, complaints of dysuria, difficulty chewing or swallowing, integumentary issues, or problems with sleep.    Past Medical History:   Diagnosis Date     Acute  respiratory failure with hypoxia      Altered mental status      Brain metastases      Breast cancer      Depression      Hypertension      Hypertrophy of breast      Hypomagnesemia      Low back pain radiating to right lower extremity      Lung metastases      Lymphedema syndrome, postmastectomy      Metastatic breast cancer      Morbid obesity      Normocytic anemia      Oral thrush      Pyuria      SDH (subdural hematoma)      Subdural hygroma      Tachycardia               Family History   Problem Relation Age of Onset     Liver disease Mother      Alcohol abuse Mother      Drug abuse Mother      Prostate cancer Father      Hypertension Sister      Hypertension Brother      Anxiety disorder Niece      ADD / ADHD Daughter      Eczema Daughter      Depression Niece      Diabetes type II Niece      Social History     Social History     Marital status: Single     Spouse name: N/A     Number of children: N/A     Years of education: N/A     Social History Main Topics     Smoking status: Former Smoker     Packs/day: 1.00     Years: 15.00     Types: Cigarettes     Quit date: 3/1/2004     Smokeless tobacco: Never Used     Alcohol use No     Drug use: No     Sexual activity: Not on file     Other Topics Concern     Not on file     Social History Narrative     MEDICATIONS: Reviewed from the MAR, physician orders, and earlier progress notes.  Updated by me today (08/03/17) with changes made by [facility] reflected below.  Current Outpatient Prescriptions   Medication Sig     diphenoxylate-atropine (LOMOTIL) 2.5-0.025 mg per tablet Take 1 tablet by mouth. 1 tablet after each loose stool, maximum of 20 mg in 24 hours.     haloperidol (HALDOL) 2 mg/mL solution Take 0.5 mg by mouth every hour as needed (Delerium).     acetaminophen (TYLENOL) 325 MG tablet Take 650 mg by mouth every 4 (four) hours as needed for pain.     albuterol (PROAIR HFA;PROVENTIL HFA;VENTOLIN HFA) 90 mcg/actuation inhaler Inhale 2 puffs every 4  "(four) hours as needed for wheezing.     atropine 1 % ophthalmic solution Take 2 drops by mouth every hour as needed.     baclofen (LIORESAL) 10 MG tablet Take 5 mg by mouth 3 (three) times a day as needed.     citalopram (CELEXA) 20 MG tablet Take 40 mg by mouth daily.     dexamethasone (DECADRON) 4 MG tablet Take 4 mg by mouth every 8 (eight) hours.     haloperidol (HALDOL) 0.5 MG tablet Take 0.5 mg by mouth every hour as needed.     levETIRAcetam (KEPPRA) 500 MG tablet Take 500 mg by mouth 2 (two) times a day.     LORazepam (ATIVAN) 0.5 MG tablet Take 0.5 mg by mouth every 2 (two) hours as needed for anxiety.      LORazepam (ATIVAN) 2 mg/mL concentrated solution Take 1 mg by mouth as needed. every 15 min up to 4 times during a seizure (Notify hospice if not effective)     omeprazole (PRILOSEC) 20 MG capsule Take 20 mg by mouth daily.     oxyCODONE (ROXICODONE) 5 MG immediate release tablet Take 5 mg by mouth every hour as needed for pain.      prazosin (MINIPRESS) 1 MG capsule Take 1 mg by mouth at bedtime.     prochlorperazine (COMPAZINE) 10 MG tablet Take 10 mg by mouth every 6 (six) hours as needed for nausea.     ALLERGIES:   Allergies   Allergen Reactions     Blood-Group Specific Substance      Patient is Jehovah Witness and does not want blood transfusions     Penicillins      Strawberry       Vitals:    08/03/17 1459   BP: 128/72   Pulse: 98   Resp: 21   Temp: 98.4  F (36.9  C)   Weight: 220 lb 3.2 oz (99.9 kg)   Height: 5' 6\" (1.676 m)     Body mass index is 35.54 kg/(m^2).    EXAMINATION:   General: Obese -American female, wearing a wig due to hair loss, lying in bed, in no apparent distress, although she does appear somewhat dysthymic.  She is busy using her smart phone.   Head: Normocephalic and atraumatic.   Eyes: PERRL, sclerae clear.   ENT: Moist oral mucosa. Good dentition  Cardiovascular: Regular rate and rhythm with a 3/6 systolic ejection murmur at the left sternal border.  Respiratory: " Lungs clear to auscultation bilaterally.   Abdomen: Soft and nontender.   Musculoskeletal/Extremities: Very firm left breast (status post right mastectomy).  No peripheral edema.  Integument: No rashes, clinically significant lesions, or skin breakdown.   Cognitive/Psychiatric: Alert but possibly not entirely oriented.  Affect is somewhat dysthymic.    DIAGNOSTICS:   None.    ASSESSMENT/Plan:      ICD-10-CM    1. Breast cancer C50.919    2. Metastasis to brain C79.31    3. Depression F32.9    4. Essential hypertension I10    5. Malnutrition E46      CHANGES:    None.      CARE PLAN:    The care plan has been reviewed and all orders signed. Changes to care plan, if any, as noted. Otherwise, continue care plan of care.      Electronically signed by: Noe Lazaro CNP

## 2021-06-15 PROBLEM — C50.919 BREAST CANCER (H): Status: ACTIVE | Noted: 2017-01-01

## 2021-06-15 PROBLEM — F32.A DEPRESSION: Status: ACTIVE | Noted: 2017-01-01

## 2021-06-15 PROBLEM — I10 ESSENTIAL HYPERTENSION: Status: ACTIVE | Noted: 2017-01-01

## 2021-06-15 PROBLEM — E46 MALNUTRITION (H): Status: ACTIVE | Noted: 2017-01-01

## 2021-06-16 PROBLEM — C79.31 METASTASIS TO BRAIN (H): Status: ACTIVE | Noted: 2017-01-01
